# Patient Record
Sex: FEMALE | Race: BLACK OR AFRICAN AMERICAN | Employment: FULL TIME | URBAN - METROPOLITAN AREA
[De-identification: names, ages, dates, MRNs, and addresses within clinical notes are randomized per-mention and may not be internally consistent; named-entity substitution may affect disease eponyms.]

---

## 2017-01-01 ENCOUNTER — DOCUMENTATION ONLY (OUTPATIENT)
Dept: INTERNAL MEDICINE CLINIC | Age: 42
End: 2017-01-01

## 2017-01-01 RX ORDER — LEVOTHYROXINE SODIUM 50 UG/1
TABLET ORAL
Qty: 90 TAB | Refills: 0 | Status: SHIPPED | OUTPATIENT
Start: 2017-01-01 | End: 2017-09-07 | Stop reason: SDUPTHER

## 2017-01-01 NOTE — PROGRESS NOTES
Received call that patient is out of levothyroxine and would like to  refill at local [facility]. Medication refilled.

## 2017-01-11 ENCOUNTER — TELEPHONE (OUTPATIENT)
Dept: FAMILY MEDICINE CLINIC | Age: 42
End: 2017-01-11

## 2017-01-11 NOTE — TELEPHONE ENCOUNTER
Called pharmacy to inquire about the eye drop for the pt. Myasge received via covermymeds about this medication not needing a PA. Will attempt to call again.

## 2017-05-16 ENCOUNTER — TELEPHONE (OUTPATIENT)
Dept: FAMILY MEDICINE CLINIC | Age: 42
End: 2017-05-16

## 2017-09-07 RX ORDER — LEVOTHYROXINE SODIUM 50 UG/1
TABLET ORAL
Qty: 30 TAB | Refills: 0 | Status: SHIPPED | OUTPATIENT
Start: 2017-09-07 | End: 2017-09-11 | Stop reason: SDUPTHER

## 2017-09-07 NOTE — TELEPHONE ENCOUNTER
Pt calling to request medication refill of:    Requested Prescriptions     Pending Prescriptions Disp Refills    levothyroxine (SYNTHROID) 50 mcg tablet 30 Tab 0     Sig: TAKE 1 TABLET BY MOUTH DAILY BEFORE BREAKFAST          be sent to [facility]. Pt has about 0 tabs remaining. Pts last appt was 12/14/16, next appt sched for 09/11/17. Advised pt of 72 hour time frame for refill requests. Please advise.

## 2017-09-07 NOTE — TELEPHONE ENCOUNTER
Please see refill request for patient. Patient is scheduled to see you 09/11/2017.  Thank you    Requested Prescriptions     Pending Prescriptions Disp Refills    levothyroxine (SYNTHROID) 50 mcg tablet 30 Tab 0     Sig: TAKE 1 TABLET BY MOUTH DAILY BEFORE BREAKFAST

## 2017-09-11 ENCOUNTER — OFFICE VISIT (OUTPATIENT)
Dept: FAMILY MEDICINE CLINIC | Age: 42
End: 2017-09-11

## 2017-09-11 VITALS
WEIGHT: 209 LBS | SYSTOLIC BLOOD PRESSURE: 124 MMHG | BODY MASS INDEX: 31.67 KG/M2 | HEIGHT: 68 IN | HEART RATE: 76 BPM | DIASTOLIC BLOOD PRESSURE: 82 MMHG | OXYGEN SATURATION: 97 % | RESPIRATION RATE: 16 BRPM

## 2017-09-11 DIAGNOSIS — R82.90 FOUL SMELLING URINE: ICD-10-CM

## 2017-09-11 DIAGNOSIS — E03.9 ACQUIRED HYPOTHYROIDISM: Primary | ICD-10-CM

## 2017-09-11 LAB
BILIRUB UR QL STRIP: NEGATIVE
GLUCOSE UR-MCNC: NEGATIVE MG/DL
KETONES P FAST UR STRIP-MCNC: NEGATIVE MG/DL
PH UR STRIP: 5.5 [PH] (ref 4.6–8)
PROT UR QL STRIP: NEGATIVE MG/DL
SP GR UR STRIP: 1.02 (ref 1–1.03)
UA UROBILINOGEN AMB POC: NORMAL (ref 0.2–1)
URINALYSIS CLARITY POC: CLEAR
URINALYSIS COLOR POC: YELLOW
URINE BLOOD POC: NORMAL
URINE LEUKOCYTES POC: NEGATIVE
URINE NITRITES POC: NEGATIVE

## 2017-09-11 NOTE — PROGRESS NOTES
Yuniel Ramirez is a 43 y.o. female presents to office for Thyroid check and UTI       1. Have you been to the ER, urgent care clinic or hospitalized since your last visit?  No           Health Maintenance items with a due date reviewed with patient:  Health Maintenance Due   Topic Date Due    DTaP/Tdap/Td series (1 - Tdap) 05/18/1996    PAP AKA CERVICAL CYTOLOGY  10/08/2016    INFLUENZA AGE 9 TO ADULT  08/01/2017

## 2017-09-11 NOTE — PROGRESS NOTES
HISTORY OF PRESENT ILLNESS  Patricia Baron is a 43 y.o. female who is here for f/u of hypothyroidism. She took her last synthroid pill three days ago. She has been feeling a little tired since two days ago, which she figures is because of being without the synthroid. Her only concern is that for a couple of days her urine has had a strong ammonia smell to it. She denies any dysuria, frequency, or urgency. Thyroid Problem   The history is provided by the patient. This is a chronic problem. The problem has not changed since onset. Pertinent negatives include no chest pain and no shortness of breath. Nothing aggravates the symptoms. The symptoms are relieved by medications. Urinary Odor   Pertinent negatives include no chest pain and no shortness of breath. Nothing aggravates the symptoms. She has tried nothing for the symptoms. Review of Systems   Constitutional: Positive for malaise/fatigue (mild fatigue). Negative for chills and fever. Respiratory: Negative for shortness of breath. Cardiovascular: Negative for chest pain and palpitations. Gastrointestinal: Negative for nausea. Genitourinary: Negative for dysuria, frequency and urgency. Musculoskeletal: Negative for back pain. Past Medical History:   Diagnosis Date    Thyroid disease     with pregnancy     Allergies   Allergen Reactions    Erythromycin Nausea and Vomiting     Current Outpatient Prescriptions   Medication Sig    levothyroxine (SYNTHROID) 50 mcg tablet TAKE 1 TABLET DAILY BEFORE BREAKFAST    betamethasone dipropionate (DIPROLENE) 0.05 % ointment Apply  to affected area two (2) times a day.  fluticasone (FLONASE) 50 mcg/actuation nasal spray 2 Sprays by Both Nostrils route daily.  ibuprofen (MOTRIN) 800 mg tablet Take 800 mg by mouth every eight (8) hours as needed. No current facility-administered medications for this visit.         Objective  Visit Vitals    /82 (BP 1 Location: Right arm, BP Patient Position: Sitting)    Pulse 76    Resp 16    Ht 5' 7.75\" (1.721 m)    Wt 209 lb (94.8 kg)    LMP 07/12/2015    SpO2 97%    BMI 32.01 kg/m2       Physical Exam   Constitutional: She appears well-developed and well-nourished. Neck: Neck supple. No thyromegaly present. Cardiovascular: Normal rate and regular rhythm. No murmur heard. Pulmonary/Chest: Effort normal and breath sounds normal.   Abdominal: Soft. She exhibits no distension. There is no tenderness. Lymphadenopathy:     She has no cervical adenopathy. ASSESSMENT and PLAN    ICD-10-CM ICD-9-CM    1. Acquired hypothyroidism E03.9 244.9 TSH 3RD GENERATION      TSH 3RD GENERATION   2. Foul smelling urine R82.90 791.9 AMB POC URINALYSIS DIP STICK AUTO W/O MICRO     Patient reassured that the urine result did not indicate any infection and advocated increased water intake. Thyroid level will be checked and result should be back tomorrow and will then reorder same dose or alter it if result indicates such. Patient verbalizes understanding and agrees with plan.

## 2017-09-12 LAB — TSH SERPL DL<=0.005 MIU/L-ACNC: 2.8 UIU/ML (ref 0.45–4.5)

## 2017-09-13 RX ORDER — LEVOTHYROXINE SODIUM 50 UG/1
50 TABLET ORAL
Qty: 30 TAB | Refills: 5 | Status: SHIPPED | OUTPATIENT
Start: 2017-09-13 | End: 2018-04-02 | Stop reason: SDUPTHER

## 2017-09-13 NOTE — TELEPHONE ENCOUNTER
Pt is calling to follow up on lab results and needs Rx on  Requested Prescriptions     Pending Prescriptions Disp Refills    levothyroxine (SYNTHROID) 50 mcg tablet 30 Tab 5     Sig: Take 1 Tab by mouth Daily (before breakfast). Pt has been out of the medication since Monday. Please review TSH and determine the appropriate dose, thank you.

## 2017-09-13 NOTE — TELEPHONE ENCOUNTER
Dr. Drew Waters, please see refill request for patient of Tar[facility], thank you! Requested Prescriptions     Pending Prescriptions Disp Refills    levothyroxine (SYNTHROID) 50 mcg tablet 30 Tab 5     Sig: Take 1 Tab by mouth Daily (before breakfast).

## 2017-10-04 RX ORDER — LEVOTHYROXINE SODIUM 50 UG/1
TABLET ORAL
Qty: 30 TAB | Refills: 0 | Status: SHIPPED | OUTPATIENT
Start: 2017-10-04 | End: 2017-11-03 | Stop reason: SDUPTHER

## 2017-11-03 NOTE — TELEPHONE ENCOUNTER
Pt calling to request medication refill of:    Requested Prescriptions     Pending Prescriptions Disp Refills    levothyroxine (SYNTHROID) 50 mcg tablet 90 Tab 2     Sig: Take 1 Tab by mouth Daily (before breakfast). be sent to [facility]. Pt has about 5 tabs remaining. Pts last appt was 09/11/17. Advised pt of 72 hour time frame for refill requests. ***Pt is also concerned about strong urine, she is drinking plenty of water but never gets lighter. Asking about blood work/ urine labs. Please advise.

## 2017-11-06 RX ORDER — LEVOTHYROXINE SODIUM 50 UG/1
TABLET ORAL
Qty: 30 TAB | Refills: 0 | Status: SHIPPED | OUTPATIENT
Start: 2017-11-06 | End: 2018-04-17 | Stop reason: SDUPTHER

## 2017-11-06 RX ORDER — LEVOTHYROXINE SODIUM 50 UG/1
50 TABLET ORAL
Qty: 30 TAB | Refills: 0 | Status: SHIPPED | OUTPATIENT
Start: 2017-11-06 | End: 2017-12-14 | Stop reason: SDUPTHER

## 2017-12-18 RX ORDER — LEVOTHYROXINE SODIUM 50 UG/1
TABLET ORAL
Qty: 30 TAB | Refills: 0 | Status: SHIPPED | OUTPATIENT
Start: 2017-12-18 | End: 2018-01-16 | Stop reason: SDUPTHER

## 2018-03-07 ENCOUNTER — OFFICE VISIT (OUTPATIENT)
Dept: FAMILY MEDICINE CLINIC | Age: 43
End: 2018-03-07

## 2018-03-07 VITALS
SYSTOLIC BLOOD PRESSURE: 126 MMHG | RESPIRATION RATE: 16 BRPM | DIASTOLIC BLOOD PRESSURE: 87 MMHG | TEMPERATURE: 97.7 F | WEIGHT: 216.8 LBS | OXYGEN SATURATION: 98 % | HEART RATE: 70 BPM | HEIGHT: 67 IN | BODY MASS INDEX: 34.03 KG/M2

## 2018-03-07 DIAGNOSIS — J02.9 SORE THROAT: Primary | ICD-10-CM

## 2018-03-07 DIAGNOSIS — E03.4 HYPOTHYROIDISM DUE TO ACQUIRED ATROPHY OF THYROID: ICD-10-CM

## 2018-03-07 LAB
S PYO AG THROAT QL: NEGATIVE
VALID INTERNAL CONTROL?: YES

## 2018-03-07 NOTE — PROGRESS NOTES
HISTORY OF PRESENT ILLNESS  Kary Severance is a 43 y.o. female here for evaluation of sore throat and follow-up on hypothyroidism. Patient developed sore throat about 1 week ago. She denies fever. .  Sore Throat    The history is provided by the patient. This is a new problem. The problem has not changed since onset. There has been no fever. Pertinent negatives include no diarrhea, no vomiting, no congestion, no headaches, no shortness of breath, no trouble swallowing and no stiff neck. She has tried nothing for the symptoms. Thyroid Problem   The history is provided by the patient and medical records. This is a chronic problem. The problem has not changed since onset. Pertinent negatives include no chest pain, no abdominal pain, no headaches and no shortness of breath. Nothing aggravates the symptoms. The symptoms are relieved by medications. Treatments tried: Levothyroxine. The treatment provided significant relief. Allergies   Allergen Reactions    Erythromycin Nausea and Vomiting     Current Outpatient Prescriptions on File Prior to Visit   Medication Sig Dispense Refill    levothyroxine (SYNTHROID) 50 mcg tablet Take 1 Tab by mouth Daily (before breakfast). 30 Tab 5    ibuprofen (MOTRIN) 800 mg tablet Take 800 mg by mouth every eight (8) hours as needed.  levothyroxine (SYNTHROID) 50 mcg tablet TAKE 1 TABLET DAILY BEFORE BREAKFAST (NO FURTHER REFILLS UNTIL SEEN) 30 Tab 0    levothyroxine (SYNTHROID) 50 mcg tablet TAKE 1 TABLET DAILY BEFORE BREAKFAST (NO FURTHER REFILLS UNTIL SEEN) 30 Tab 0    betamethasone dipropionate (DIPROLENE) 0.05 % ointment Apply  to affected area two (2) times a day. 15 g 0    fluticasone (FLONASE) 50 mcg/actuation nasal spray 2 Sprays by Both Nostrils route daily. 1 Bottle 0     No current facility-administered medications on file prior to visit.       Past Medical History:   Diagnosis Date    Thyroid disease     with pregnancy     Past Surgical History: Procedure Laterality Date    HX TOTAL ABDOMINAL HYSTERECTOMY       Family History   Problem Relation Age of Onset    Hypertension Mother     Diabetes Mother     Kidney Disease Father      Social History     Social History    Marital status:      Spouse name: N/A    Number of children: N/A    Years of education: N/A     Occupational History    Not on file. Social History Main Topics    Smoking status: Former Smoker     Packs/day: 0.50     Years: 9.00     Types: Cigarettes     Start date: 3/3/1994     Quit date: 1/3/2003    Smokeless tobacco: Never Used    Alcohol use Yes      Comment: occasions    Drug use: No    Sexual activity: Yes     Partners: Male     Birth control/ protection: Surgical     Other Topics Concern     Service No    Blood Transfusions No    Caffeine Concern No    Occupational Exposure No    Hobby Hazards No    Sleep Concern No    Stress Concern No    Weight Concern Yes     post delivery of baby    Special Diet No    Back Care Yes    Exercise No    Seat Belt Yes    Self-Exams Yes     Social History Narrative         Review of Systems   Constitutional: Negative. HENT: Positive for sore throat. Negative for congestion and trouble swallowing. Eyes: Negative. Respiratory: Negative. Negative for shortness of breath. Cardiovascular: Negative. Negative for chest pain. Gastrointestinal: Negative. Negative for abdominal pain, diarrhea and vomiting. Musculoskeletal: Negative. Neurological: Negative. Negative for headaches. Psychiatric/Behavioral: Negative. Visit Vitals    /87 (BP 1 Location: Right arm, BP Patient Position: Sitting)    Pulse 70    Temp 97.7 °F (36.5 °C) (Oral)    Resp 16    Ht 5' 7\" (1.702 m)    Wt 216 lb 12.8 oz (98.3 kg)    LMP 07/12/2015    SpO2 98%    BMI 33.96 kg/m2       Physical Exam   Constitutional: She is oriented to person, place, and time. She appears well-developed and well-nourished. HENT:   Head: Normocephalic and atraumatic. Cardiovascular: Normal rate, regular rhythm, normal heart sounds and intact distal pulses. Exam reveals no gallop and no friction rub. No murmur heard. Pulmonary/Chest: Effort normal and breath sounds normal. No respiratory distress. She has no wheezes. She has no rales. Musculoskeletal: Normal range of motion. She exhibits no edema, tenderness or deformity. Neurological: She is alert and oriented to person, place, and time. No cranial nerve deficit. Coordination normal.   Skin: Skin is warm and dry. No rash noted. No erythema. No pallor. Psychiatric: She has a normal mood and affect. Her behavior is normal. Judgment and thought content normal.   Nursing note and vitals reviewed. ASSESSMENT and PLAN    ICD-10-CM ICD-9-CM    1. Sore throat J02.9 462 AMB POC RAPID STREP A   2. Hypothyroidism due to acquired atrophy of thyroid E03.4 244.8 TSH 3RD GENERATION     246.8      Follow-up Disposition:  Return in about 6 months (around 9/7/2018).   She has been instructed to use Sucrets or Chloraseptic spray or lozenges

## 2018-03-07 NOTE — PROGRESS NOTES
Charlee Rivero is a 43 y.o. female (: 1975) presenting to address:    Chief Complaint   Patient presents with    Medication Refill    Sore Throat       Vitals:    18 1617   BP: 126/87   Pulse: 70   Resp: 16   Temp: 97.7 °F (36.5 °C)   TempSrc: Oral   SpO2: 98%   Weight: 216 lb 12.8 oz (98.3 kg)   Height: 5' 7\" (1.702 m)   PainSc:   7   PainLoc: Throat   LMP: 2015       Hearing/Vision:   No exam data present    Learning Assessment:     Learning Assessment 3/3/2015   PRIMARY LEARNER Patient   PRIMARY LANGUAGE ENGLISH   LEARNER PREFERENCE PRIMARY OTHER (COMMENT)     LISTENING     VIDEOS     PICTURES   ANSWERED BY Patient   RELATIONSHIP SELF     Depression Screening:     PHQ over the last two weeks 2016   Little interest or pleasure in doing things Not at all   Feeling down, depressed or hopeless Not at all   Total Score PHQ 2 0     Fall Risk Assessment:     Fall Risk Assessment, last 12 mths 2016   Able to walk? Yes   Fall in past 12 months? No     Abuse Screening:     Abuse Screening Questionnaire 2016   Do you ever feel afraid of your partner? N   Are you in a relationship with someone who physically or mentally threatens you? N   Is it safe for you to go home? Y     Coordination of Care Questionaire:   1. Have you been to the ER, urgent care clinic since your last visit? Hospitalized since your last visit? NO    2. Have you seen or consulted any other health care providers outside of the [address] [place] [address] since your last visit? Include any pap smears or colon screening.  NO

## 2018-03-30 ENCOUNTER — HOSPITAL ENCOUNTER (OUTPATIENT)
Dept: LAB | Age: 43
Discharge: HOME | End: 2018-03-30
Payer: COMMERCIAL

## 2018-03-30 DIAGNOSIS — E03.4 HYPOTHYROIDISM DUE TO ACQUIRED ATROPHY OF THYROID: ICD-10-CM

## 2018-03-30 LAB — TSH SERPL DL<=0.05 MIU/L-ACNC: 2.79 UIU/ML (ref 0.36–3.74)

## 2018-03-30 PROCEDURE — 36415 COLL VENOUS BLD VENIPUNCTURE: CPT | Performed by: INTERNAL MEDICINE

## 2018-03-30 PROCEDURE — 84443 ASSAY THYROID STIM HORMONE: CPT | Performed by: INTERNAL MEDICINE

## 2018-04-02 RX ORDER — LEVOTHYROXINE SODIUM 50 UG/1
50 TABLET ORAL
Qty: 30 TAB | Refills: 5 | Status: SHIPPED | OUTPATIENT
Start: 2018-04-02 | End: 2018-04-25 | Stop reason: SDUPTHER

## 2018-04-02 NOTE — TELEPHONE ENCOUNTER
Dr. Lane Schuler, please see refill request for patient, thank you! Requested Prescriptions     Pending Prescriptions Disp Refills    levothyroxine (SYNTHROID) 50 mcg tablet 30 Tab 5     Sig: Take 1 Tab by mouth Daily (before breakfast).

## 2018-04-02 NOTE — TELEPHONE ENCOUNTER
Pt calling to request medication refill of:    Requested Prescriptions     Pending Prescriptions Disp Refills    levothyroxine (SYNTHROID) 50 mcg tablet 30 Tab 5     Sig: Take 1 Tab by mouth Daily (before breakfast). be sent to [facility] on [address] and [address]. Pt has about 0 tabs remaining. Pts last appt was 3/7/18. Advised pt of 72 hour time frame for refill requests. Please advise.     **Patient had labs done 3/30/18**

## 2018-04-03 ENCOUNTER — TELEPHONE (OUTPATIENT)
Dept: FAMILY MEDICINE CLINIC | Age: 43
End: 2018-04-03

## 2018-04-03 NOTE — TELEPHONE ENCOUNTER
----- Message from Trudi Hammans, MD sent at 4/2/2018 12:58 PM EDT -----  TSH is within normal limits

## 2018-04-03 NOTE — LETTER
4/3/2018 3:11 PM 
 
Ms. Rual Payton [address] [place] [address] [address] [place] [id number] Dear Raul Payton I have reviewed your recent lab tests and have found the results to be within normal ranges. Please call if you have any questions [phone] . Sincerely, Rell Fair MD

## 2018-04-17 ENCOUNTER — OFFICE VISIT (OUTPATIENT)
Dept: FAMILY MEDICINE CLINIC | Age: 43
End: 2018-04-17

## 2018-04-17 VITALS
RESPIRATION RATE: 20 BRPM | BODY MASS INDEX: 34.09 KG/M2 | WEIGHT: 217.2 LBS | OXYGEN SATURATION: 97 % | TEMPERATURE: 97.7 F | DIASTOLIC BLOOD PRESSURE: 78 MMHG | SYSTOLIC BLOOD PRESSURE: 118 MMHG | HEIGHT: 67 IN | HEART RATE: 72 BPM

## 2018-04-17 DIAGNOSIS — M62.830 BACK SPASM: Primary | ICD-10-CM

## 2018-04-17 RX ORDER — CYCLOBENZAPRINE HCL 10 MG
10 TABLET ORAL
Qty: 90 TAB | Refills: 0 | Status: SHIPPED | OUTPATIENT
Start: 2018-04-17 | End: 2019-05-06

## 2018-04-17 NOTE — PROGRESS NOTES
Oziel Bruce is a 43 y.o. female (: 1975) presenting to address:    Chief Complaint   Patient presents with    Spasms     Shoulders, middle back and lower back       Vitals:    18 1726   Weight: 217 lb 3.2 oz (98.5 kg)   Height: 5' 7\" (1.702 m)   PainSc:   8   PainLoc: Back   LMP: 2015       Hearing/Vision:   No exam data present    Learning Assessment:     Learning Assessment 3/3/2015   PRIMARY LEARNER Patient   PRIMARY LANGUAGE ENGLISH   LEARNER PREFERENCE PRIMARY OTHER (COMMENT)     LISTENING     VIDEOS     PICTURES   ANSWERED BY Patient   RELATIONSHIP SELF     Depression Screening:     PHQ over the last two weeks 2018   Little interest or pleasure in doing things Not at all   Feeling down, depressed or hopeless Not at all   Total Score PHQ 2 0     Fall Risk Assessment:     Fall Risk Assessment, last 12 mths 2016   Able to walk? Yes   Fall in past 12 months? No     Abuse Screening:     Abuse Screening Questionnaire 2016   Do you ever feel afraid of your partner? N   Are you in a relationship with someone who physically or mentally threatens you? N   Is it safe for you to go home? Y     Coordination of Care Questionaire:   1. Have you been to the ER, urgent care clinic since your last visit? Hospitalized since your last visit? no    2. Have you seen or consulted any other health care providers outside of the [address] [place] [address] since your last visit? Include any pap smears or colon screening. no    Advanced Directive:   1. Do you have an Advanced Directive?no  2. Would you like information on Advanced Directives?  no

## 2018-04-17 NOTE — PROGRESS NOTES
HISTORY OF PRESENT ILLNESS  Joan Cuellar is a 43 y.o. female for evaluation of back spasms. Patient states that at least once a year she develops these back spasms. .  Spasms   The history is provided by the patient. This is a recurrent problem. The problem has been gradually worsening. Pertinent negatives include no chest pain, no abdominal pain and no headaches. Nothing aggravates the symptoms. Nothing relieves the symptoms. She has tried nothing for the symptoms. Allergies   Allergen Reactions    Erythromycin Nausea and Vomiting     Current Outpatient Prescriptions on File Prior to Visit   Medication Sig Dispense Refill    levothyroxine (SYNTHROID) 50 mcg tablet Take 1 Tab by mouth Daily (before breakfast). 30 Tab 5    betamethasone dipropionate (DIPROLENE) 0.05 % ointment Apply  to affected area two (2) times a day. 15 g 0    ibuprofen (MOTRIN) 800 mg tablet Take 800 mg by mouth every eight (8) hours as needed. No current facility-administered medications on file prior to visit. Past Medical History:   Diagnosis Date    Thyroid disease     with pregnancy     Past Surgical History:   Procedure Laterality Date    HX TOTAL ABDOMINAL HYSTERECTOMY       Family History   Problem Relation Age of Onset    Hypertension Mother     Diabetes Mother     Kidney Disease Father      Social History     Social History    Marital status:      Spouse name: N/A    Number of children: N/A    Years of education: N/A     Occupational History    Not on file.      Social History Main Topics    Smoking status: Former Smoker     Packs/day: 0.50     Years: 9.00     Types: Cigarettes     Start date: 3/3/1994     Quit date: 1/3/2003    Smokeless tobacco: Never Used    Alcohol use Yes      Comment: occasions    Drug use: No    Sexual activity: Yes     Partners: Male     Birth control/ protection: Surgical     Other Topics Concern     Service No    Blood Transfusions No    Caffeine Concern No    Occupational Exposure No    Hobby Hazards No    Sleep Concern No    Stress Concern No    Weight Concern Yes     post delivery of baby    Special Diet No    Back Care Yes    Exercise No    Seat Belt Yes    Self-Exams Yes     Social History Narrative         Review of Systems   Constitutional: Negative. Eyes: Negative. Respiratory: Negative. Cardiovascular: Negative. Negative for chest pain. Gastrointestinal: Negative for abdominal pain. Musculoskeletal: Positive for muscle spasms. Neurological: Negative. Negative for headaches. Endo/Heme/Allergies: Negative. Psychiatric/Behavioral: Negative. Visit Vitals    /78 (BP 1 Location: Right arm, BP Patient Position: Sitting)    Pulse 72    Temp 97.7 °F (36.5 °C) (Oral)    Resp 20    Ht 5' 7\" (1.702 m)    Wt 217 lb 3.2 oz (98.5 kg)    LMP 07/12/2015    SpO2 97%    BMI 34.02 kg/m2         Physical Exam   Constitutional: She is oriented to person, place, and time. She appears well-developed and well-nourished. HENT:   Head: Normocephalic and atraumatic. Cardiovascular: Normal rate, regular rhythm, normal heart sounds and intact distal pulses. Exam reveals no gallop and no friction rub. No murmur heard. Pulmonary/Chest: Effort normal and breath sounds normal. No respiratory distress. She has no wheezes. She has no rales. Musculoskeletal: Normal range of motion. She exhibits no edema, tenderness or deformity. Neurological: She is alert and oriented to person, place, and time. No cranial nerve deficit. Coordination normal.   Skin: Skin is warm and dry. No rash noted. No erythema. No pallor. Psychiatric: She has a normal mood and affect. Her behavior is normal. Judgment and thought content normal.   Nursing note and vitals reviewed. ASSESSMENT and PLAN    ICD-10-CM ICD-9-CM    1. Back spasm M62.830 724.8      Follow-up Disposition:  Return if symptoms worsen or fail to improve.

## 2018-11-26 NOTE — TELEPHONE ENCOUNTER
Pt calling to request med refill of:  Requested Prescriptions     Pending Prescriptions Disp Refills    levothyroxine (SYNTHROID) 50 mcg tablet 30 Tab 0     Be sent to [facility]    Pt has 0 tabs remaining     Pts last appt was  4/17/18 & no f/u scheduled    Pt advised of 72 hour time frame. Please advise.

## 2018-11-27 RX ORDER — LEVOTHYROXINE SODIUM 50 UG/1
TABLET ORAL
Qty: 30 TAB | Refills: 0 | OUTPATIENT
Start: 2018-11-27

## 2019-05-06 ENCOUNTER — OFFICE VISIT (OUTPATIENT)
Dept: FAMILY MEDICINE CLINIC | Age: 44
End: 2019-05-06

## 2019-05-06 VITALS
HEART RATE: 66 BPM | RESPIRATION RATE: 16 BRPM | TEMPERATURE: 98 F | BODY MASS INDEX: 35.47 KG/M2 | SYSTOLIC BLOOD PRESSURE: 126 MMHG | OXYGEN SATURATION: 98 % | DIASTOLIC BLOOD PRESSURE: 80 MMHG | WEIGHT: 226 LBS | HEIGHT: 67 IN

## 2019-05-06 DIAGNOSIS — E03.4 HYPOTHYROIDISM DUE TO ACQUIRED ATROPHY OF THYROID: Primary | ICD-10-CM

## 2019-05-06 DIAGNOSIS — E66.01 SEVERE OBESITY (HCC): ICD-10-CM

## 2019-05-06 RX ORDER — LEVOTHYROXINE SODIUM 50 UG/1
50 TABLET ORAL
Qty: 90 TAB | Refills: 1 | Status: SHIPPED | OUTPATIENT
Start: 2019-05-06

## 2020-01-22 ENCOUNTER — HOSPITAL ENCOUNTER (OUTPATIENT)
Dept: NUTRITION | Age: 45
Discharge: HOME | End: 2020-01-22
Payer: COMMERCIAL

## 2020-01-22 PROCEDURE — 97802 MEDICAL NUTRITION INDIV IN: CPT

## 2020-01-22 NOTE — PROGRESS NOTES
[address] [place] [address] Greg Vega [address], [address] [facility]  Phone: [phone] Fax: [phone]   Nutrition Assessment  Medical Nutrition Therapy   Outpatient Initial Evaluation         Patient Name: Ophelia Stokes : 1975   Treatment Diagnosis: obesity   Referral Source: Jaden Brennan MD [facility]): 2020     Gender: female Age: 40 y.o. Ht: 67 In Wt: 222.8 lb  kg   BMI: 34.9 BMR   Male  BMR Female [id number]     Past Medical History:  Hypothyroid; HTN     Pertinent Medications:     levothyroxine   Biochemical Data:   No results found for: HBA1C, HGBE8, AKB8GLEK, UTZ0NQTB  No results found for: NA, K, CL, CO2, AGAP, GLU, BUN, CREA, BUCR, GFRAA, GFRNA, CA, TBIL, TBILI, GPT, SGOT, AP, TP, ALB, GLOB, AGRAT, ALT  No results found for: CHOL, CHOLPOCT, CHOLX, CHLST, CHOLV, HDL, HDLPOC, HDLP, LDL, LDLCPOC, LDLC, DLDLP, VLDLC, VLDL, TGLX, TRIGL, TRIGP, TGLPOCT, CHHD, CHHDX  No results found for: GPT, ALT, SGOT, GGT, GGTP, AP, APIT, APX, CBIL, TBIL, TBILI  No results found for: LA, CREAPOC, ACREA, CREA, REFC3, REFC4  No results found for: BUN, BUNPOC, IBUN, MBUNV, BUNV  No results found for: MCACR, MCA1, MCA2, MCA3, MCAU, MCAU2, MCALPOCT     Assessment:    Pt reports long time struggle with weight loss. She has lost and regained the same weight many times over the past several years. Pt was recently diagnosed with HTN, after c/o fatigue, headaches and edema. Pt lives at home with her 7yo daughter, 25 and 22 yo sons and her . Pt works from home but admits she sits most of the day. She is not currently participating in any formal exercise program, but expressed interest in walking. Pt's goal is to weigh under 200 lbs. She agreed to all goals and recommendations provided today.        Food & Nutrition: B- kind bar + coffee + creamer  L- cereal or fast food or grilled cheese  D- fast food or seafood  Sn- chips, cookies    Pt adopted a vegetarian lifestyle many years ago, but stated she would be open to adding chicken and turkey back into her diet if needed. Pt drinks water throughout the day, but is inconsistent with the amount. Estimate Needs   Calories: 1500  Protein: 113 Carbs: 150 Fat: 50   Kcal/day  g/day  g/day  g/day        percent: 30  40  30               Nutrition Diagnosis Nutrition Diagnosis: Obesity related to increased PO intake and lack of activity as evidenced by high BMI and unhealthy diet and exercise recall. Nutrition Intervention &  Education: Provided macronutrient education, including optimal times to eat throughout the day and appropriate balance of macronutrients to promote more efficient RMR. Discussed the importance of developing a consistent lifestyle, including eating habits for long term weight loss and management. Provided pt with meal builder and diet plan. Handouts Provided: []  Carbohydrates  []  Protein  []  Non-starchy Vegatbles  []  Food Label  []  Meal and Snack Ideas  []  Food Journals []  Diabetes  []  Cholesterol  []  Sodium  [x]  Meal Builder  [x]  Diet Plan  []  Others:   Information Reviewed with: pt   Readiness to Change Stage: []  Pre-contemplative    [x]  Contemplative  []  Preparation               []  Action                  []  Maintenance   Potential Barriers to Learning: []  Decline in memory    []  Language barrier   []  Other:  []  Emotional                  []  Limited mobility  []  Lack of motivation     [] Vision, hearing or cognitive impairment   Expected Compliance: Good /     Nutritional Goal - To promote lifestyle changes to result in:    [x]  Weight loss  []  Improved diabetic control  []  Decreased cholesterol levels  []  Decreased blood pressure  []  Weight maintenance []  Preventing any interactions associated with food allergies  []  Adequate weight gain toward goal weight  []  Other:        Patient Goals:   1. Balance carb and protein at every meal/snack  2.  Eat 2-3 hrs after snacks and 4-5 hrs after meals     Dietitian Signature: Anita Gong MS, RD Date: 1/22/2020   Follow-up: Feb 20 at 1:00 Time: 2:24 PM